# Patient Record
Sex: MALE
[De-identification: names, ages, dates, MRNs, and addresses within clinical notes are randomized per-mention and may not be internally consistent; named-entity substitution may affect disease eponyms.]

---

## 2019-06-20 PROBLEM — Z00.00 ENCOUNTER FOR PREVENTIVE HEALTH EXAMINATION: Status: ACTIVE | Noted: 2019-06-20

## 2019-07-03 ENCOUNTER — APPOINTMENT (OUTPATIENT)
Dept: ORTHOPEDIC SURGERY | Facility: CLINIC | Age: 49
End: 2019-07-03
Payer: COMMERCIAL

## 2019-07-03 VITALS
HEIGHT: 68 IN | SYSTOLIC BLOOD PRESSURE: 142 MMHG | HEART RATE: 64 BPM | WEIGHT: 180 LBS | DIASTOLIC BLOOD PRESSURE: 97 MMHG | BODY MASS INDEX: 27.28 KG/M2

## 2019-07-03 DIAGNOSIS — S69.92XA UNSPECIFIED INJURY OF LEFT WRIST, HAND AND FINGER(S), INITIAL ENCOUNTER: ICD-10-CM

## 2019-07-03 DIAGNOSIS — M24.542 CONTRACTURE, LEFT HAND: ICD-10-CM

## 2019-07-03 PROCEDURE — 99203 OFFICE O/P NEW LOW 30 MIN: CPT

## 2019-07-03 RX ORDER — LEVOTHYROXINE SODIUM 100 UG/1
100 TABLET ORAL
Refills: 0 | Status: ACTIVE | COMMUNITY

## 2019-07-03 NOTE — HISTORY OF PRESENT ILLNESS
[Right] : right hand dominant [FreeTextEntry1] : He comes in today for evaluation of his left hand.  He injured the hand, 26 years ago.  He has had a nailbed deformity at the thumb since then.  He also has a contracture of the left middle finger.  He states that he has had 3 prior surgeries at the left middle finger.  He wants to know if anything can be done to surgically correct his problems.\par \par - He was accompanied by his wife today.

## 2019-07-03 NOTE — PHYSICAL EXAM
[de-identified] : - Constitutional: This is a male in no obvious distress.  He was accompanied by his wife today.\par - Psych: Patient is alert and oriented to person, place and time.  Patient has a normal mood and affect.\par - Cardiovascular: Normal pulses throughout the upper extremities.  No significant varicosities are noted in the upper extremities. \par - Neuro: Strength and sensation are intact throughout the upper extremities.  Patient has normal coordination.\par - Respiratory:  Patient exhibits no evidence of shortness of breath or difficulty breathing.\par - Skin: No rashes, lesions, or other abnormalities are noted in the upper extremities.\par \par ---\par \par Examination of his left thumb demonstrates a nail ridge and radially.  There is a ridge along the radial aspect of the nail, but no infection.  He has full motion.\par \par Examination of his left middle finger demonstrates multiple well-healed zigzag palmar incisions.  He has a 75° PIP joint flexion contracture.  He has active pull through of the FDS and FDP tendons, which is minimal.  He has limited terminal flexion.  He is neurovascularly intact distally.

## 2019-07-03 NOTE — DISCUSSION/SUMMARY
[FreeTextEntry1] : He has findings consistent with a left thumb nail ridge and middle finger, contracture, status post 3 prior surgeries.  These resulted after injuries 27 years ago.\par \par I had a discussion regarding today's visit, the diagnosis, and treatment options / recommendations. I did tell him that unfortunately, given the chronicity of his symptoms, I do not believe that surgical intervention will significantly improve his symptoms either.  The fingers.  I would not recommend surgery.  He understands this. \par \par The patient has agreed to this plan of management and has expressed full understanding.  All questions were fully answered to the patient's satisfaction.\par \par Over 50% of the time spent with the patient was on counseling the patient on the above diagnosis, treatment plan and prognosis.

## 2024-08-12 ENCOUNTER — APPOINTMENT (OUTPATIENT)
Dept: ORTHOPEDIC SURGERY | Facility: CLINIC | Age: 54
End: 2024-08-12
Payer: COMMERCIAL

## 2024-08-12 VITALS — WEIGHT: 180 LBS | BODY MASS INDEX: 27.28 KG/M2 | HEIGHT: 68 IN

## 2024-08-12 DIAGNOSIS — M51.36 OTHER INTERVERTEBRAL DISC DEGENERATION, LUMBAR REGION: ICD-10-CM

## 2024-08-12 PROCEDURE — 99204 OFFICE O/P NEW MOD 45 MIN: CPT

## 2024-08-12 NOTE — ADDENDUM
[FreeTextEntry1] : This note was written by Grayson Car on 08/12/2024 acting as scribe for Dr. Milo Flood M.D.  I, Milo Flood MD, have read and attest that all the information, medical decision making and discharge instructions within are true and accurate.

## 2024-08-12 NOTE — DISCUSSION/SUMMARY
[de-identified] : Coccydynia. Discussed all options. Pelvis MRI referral. Donut pillow. F/U after MRI. All options discussed including rest, medicine, home exercise, acupuncture, Chiropractic care, Physical Therapy, Pain management, and last resort surgery. All questions were answered, all alternatives discussed, and the patient is in complete agreement with the treatment plan which the patient contributed to and discussed with me through the shared decision-making process. Follow-up appointment as instructed. Any issues and the patient will call or come in sooner. Wife agrees with plan.

## 2024-08-12 NOTE — HISTORY OF PRESENT ILLNESS
[Stable] : stable [de-identified] : 54 year old male presents for initial evaluation of lower back pain since Oct 2023. Denies injury. The pain radiates down the B/L LE posteriorly to the feet. Denies numbness/tingling. Sitting aggravates the pain.  Does not take medications for the pain. Has tried PT twice a week since June which he states provides temporary relief.  Denies PERLA. PMHx: hypothyroidism  He works as a .  No fever, chills, sweats, nausea/vomiting. No bowel or bladder dysfunction, no recent weight loss or gain. No night pain. This history is in addition to the intake form that I personally reviewed.

## 2024-08-12 NOTE — PHYSICAL EXAM
[Normal] : Gait: normal [Baker's Sign] : negative Baker's sign [Pronator Drift] : negative pronator drift [SLR] : negative straight leg raise [de-identified] : 5 out of 5 motor strength, sensation is intact and symmetrical full range of motion flexion extension and rotation, no palpatory tenderness full range of motion of hips knees shoulders and elbows (all four extremities), no atrophy, negative straight leg raise, no pathological reflexes, no swelling, normal ambulation, no apparent distress skin is intact, no palpable lymph nodes, no upper or lower extremity instability, alert and oriented x3 and normal mood. Normal finger-to nose test.  No upper motor neuron findings. [de-identified] : I reviewed, interpreted and clinically correlated the following outside imaging studies, X-RAY LUMBAR SPINE 2 OR 3 VIEWS   5/31/24   ([facility])   HISTORY:  Pain.   TECHNIQUE: 3 views.  Measured angles are +/- 2 degrees and linear measurements +/- 1 mm.  COMPARISON: 2021, no interval change.  IMPRESSION:  10 mm right pelvic inferior tilt and no scoliosis.  No anterolisthesis, lysis, or pseudoarthrosis.  Otherwise, normal spine: Intact vertebrae, AP alignment, sacrum, disc spaces, lordosis, and grossly intact SI joints.  Intact medial hip joint spaces.

## 2024-08-14 DIAGNOSIS — M53.3 SACROCOCCYGEAL DISORDERS, NOT ELSEWHERE CLASSIFIED: ICD-10-CM

## 2024-09-03 ENCOUNTER — NON-APPOINTMENT (OUTPATIENT)
Age: 54
End: 2024-09-03

## 2024-09-04 ENCOUNTER — APPOINTMENT (OUTPATIENT)
Dept: ORTHOPEDIC SURGERY | Facility: CLINIC | Age: 54
End: 2024-09-04
Payer: COMMERCIAL

## 2024-09-04 VITALS
HEIGHT: 68 IN | HEART RATE: 58 BPM | WEIGHT: 185 LBS | BODY MASS INDEX: 28.04 KG/M2 | SYSTOLIC BLOOD PRESSURE: 149 MMHG | DIASTOLIC BLOOD PRESSURE: 87 MMHG

## 2024-09-04 DIAGNOSIS — M51.36 OTHER INTERVERTEBRAL DISC DEGENERATION, LUMBAR REGION: ICD-10-CM

## 2024-09-04 PROCEDURE — 99214 OFFICE O/P EST MOD 30 MIN: CPT

## 2024-09-04 RX ORDER — DICLOFENAC SODIUM 75 MG/1
75 TABLET, DELAYED RELEASE ORAL
Qty: 60 | Refills: 1 | Status: ACTIVE | COMMUNITY
Start: 2024-09-04 | End: 1900-01-01

## 2024-09-04 NOTE — DISCUSSION/SUMMARY
[de-identified] : Coccydynia. Discussed all options. Feeling better. Diclofenac PRN. If no better pain management. All options discussed including rest, medicine, home exercise, acupuncture, Chiropractic care, Physical Therapy, Pain management, and last resort surgery. All questions were answered, all alternatives discussed, and the patient is in complete agreement with the treatment plan which the patient contributed to and discussed with me through the shared decision-making process. Follow-up appointment as instructed. Any issues and the patient will call or come in sooner. Wife agrees with plan.

## 2024-09-04 NOTE — PHYSICAL EXAM
[Normal] : Gait: normal [Baker's Sign] : negative Baker's sign [Pronator Drift] : negative pronator drift [SLR] : negative straight leg raise [de-identified] : 5 out of 5 motor strength, sensation is intact and symmetrical full range of motion flexion extension and rotation, no palpatory tenderness full range of motion of hips knees shoulders and elbows (all four extremities), no atrophy, negative straight leg raise, no pathological reflexes, no swelling, normal ambulation, no apparent distress skin is intact, no palpable lymph nodes, no upper or lower extremity instability, alert and oriented x3 and normal mood. Normal finger-to nose test.  No upper motor neuron findings. [de-identified] : I reviewed, interpreted and clinically correlated the following outside imaging studies, MRI PELVIS WITHOUT CONTRAST  8/23/24   ([facility])   HISTORY:  Pelvic bone pain  TECHNIQUE:  MRI of the osseous pelvis was performed with multiplanar, multisequential MR imaging. No intravenous or intra-articular contrast was administered.  COMPARISON:  None available  FINDINGS:  Motion artifact in multiple sequences, slightly limiting the evaluation.  Osseous structures:  No fracture or osteonecrosis.  Hip joints:  Intact bilateral hip joint with offset at the femoral head/neck junction with edema and subcortical cyst formation, left greater than right, reflecting signs of impingement. Bilateral small joint effusions.  Sacroiliac joints: Intact sacroiliac joints with mild osteoarthritis bilaterally with osteophytic spurring and periarticular cystic foci.  Tendons:  Intact bilateral gluteal tendons. No trochanteric bursitis. Iliopsoas tendons are intact bilaterally. Hamstring origin tendinosis and peritendinitis bilaterally predominantly involving the semimembranosus. No high-grade tears.  Ischiofemoral fossae:  Mild narrowing of the ischiofemoral interval bilaterally with minimal edema on the right in the quadratus femoris.  Inguinal canals:  Fat-containing left indirect inguinal hernia.  Included lumbar spine and sacrum: Intact included sacrum. Angulation at the intercoccygeal vertebrae. L5-S1 disc bulge, bilateral facet joint osteoarthritis and ligamentum flavum thickening without spinal canal stenosis. Partially visualized L4-L5 demonstrate moderate spinal canal stenosis with facet joint osteoarthritis.  Muscles and nerves: No intramuscular edema or atrophy. Visualized sciatic and femoral nerves are normal in course, caliber and signal intensity.  Soft tissues: No free fluid in the pelvis. No subcutaneous collections. Normal-appearing inguinal lymph nodes.  IMPRESSION:   1.  Osseous structures are intact without fracture or avascular necrosis. 2.  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 3.  Mild bilateral sacroiliac joint osteoarthritis. 4.  Bilateral hamstring origin tendinosis and peritendinitis, predominantly involving the semimembranosus without high-grade tears. 5.  Narrowing of the ischiofemoral interval bilaterally with findings reflecting mild impingement on the right secondary to edema in the quadratus femoris. 6.  Partially visualized lumbar spine demonstrates L5-S1 disc bulge and facet joint osteoarthritis and moderate spinal canal stenosis at L4-L5. Dedicated MR lumbar spine is suggested for further evaluation. 7.  Small fat-containing left indirect inguinal hernia.   X-RAY LUMBAR SPINE 2 OR 3 VIEWS   5/31/24   ([facility])   HISTORY:  Pain.   TECHNIQUE: 3 views.  Measured angles are +/- 2 degrees and linear measurements +/- 1 mm.  COMPARISON: 2021, no interval change.  IMPRESSION:  10 mm right pelvic inferior tilt and no scoliosis.  No anterolisthesis, lysis, or pseudoarthrosis.  Otherwise, normal spine: Intact vertebrae, AP alignment, sacrum, disc spaces, lordosis, and grossly intact SI joints.  Intact medial hip joint spaces.

## 2024-09-04 NOTE — HISTORY OF PRESENT ILLNESS
[Stable] : stable [de-identified] : 54 year old male presents for initial evaluation of lower back pain since Oct 2023. Denies injury. The pain radiates down the B/L LE posteriorly to the feet. Denies numbness/tingling. Sitting aggravates the pain.  Does not take medications for the pain. Has tried PT twice a week since June which he states provides temporary relief.  Denies PERLA. Presents today for MRI Pelvis review.  PMHx: hypothyroidism  He works as a .  No fever, chills, sweats, nausea/vomiting. No bowel or bladder dysfunction, no recent weight loss or gain. No night pain. This history is in addition to the intake form that I personally reviewed.